# Patient Record
Sex: FEMALE | Race: WHITE | HISPANIC OR LATINO | Employment: STUDENT | URBAN - METROPOLITAN AREA
[De-identification: names, ages, dates, MRNs, and addresses within clinical notes are randomized per-mention and may not be internally consistent; named-entity substitution may affect disease eponyms.]

---
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
## 2021-01-27 ENCOUNTER — OFFICE VISIT (OUTPATIENT)
Dept: NEUROLOGY | Facility: CLINIC | Age: 21
End: 2021-01-27
Payer: COMMERCIAL

## 2021-01-27 DIAGNOSIS — G40.909 SEIZURE DISORDER: ICD-10-CM

## 2021-01-27 PROCEDURE — 99204 PR OFFICE/OUTPT VISIT, NEW, LEVL IV, 45-59 MIN: ICD-10-PCS | Mod: 95,,, | Performed by: PSYCHIATRY & NEUROLOGY

## 2021-01-27 PROCEDURE — 99204 OFFICE O/P NEW MOD 45 MIN: CPT | Mod: 95,,, | Performed by: PSYCHIATRY & NEUROLOGY

## 2021-01-27 RX ORDER — LEVETIRACETAM 500 MG/1
TABLET, EXTENDED RELEASE ORAL
Qty: 150 TABLET | Refills: 11 | Status: SHIPPED | OUTPATIENT
Start: 2021-01-27 | End: 2021-06-03 | Stop reason: SDUPTHER

## 2021-02-06 ENCOUNTER — PATIENT MESSAGE (OUTPATIENT)
Dept: NEUROLOGY | Facility: CLINIC | Age: 21
End: 2021-02-06

## 2021-04-14 ENCOUNTER — TELEPHONE (OUTPATIENT)
Dept: NEUROLOGY | Facility: CLINIC | Age: 21
End: 2021-04-14

## 2021-05-10 ENCOUNTER — PATIENT MESSAGE (OUTPATIENT)
Dept: NEUROLOGY | Facility: CLINIC | Age: 21
End: 2021-05-10

## 2021-05-10 ENCOUNTER — TELEPHONE (OUTPATIENT)
Dept: NEUROLOGY | Facility: CLINIC | Age: 21
End: 2021-05-10

## 2021-05-10 NOTE — TELEPHONE ENCOUNTER
----- Message from Augustus Urena sent at 5/10/2021  4:31 PM CDT -----  Contact: MOM  Pt mom calling still waiting for the corrected paper work for daughter for DMV  and since we had a     delay on this daughter licence is now suspended       Please Call      Contact [phone]

## 2021-05-12 ENCOUNTER — TELEPHONE (OUTPATIENT)
Dept: NEUROLOGY | Facility: CLINIC | Age: 21
End: 2021-05-12

## 2021-06-03 ENCOUNTER — OFFICE VISIT (OUTPATIENT)
Dept: NEUROLOGY | Facility: CLINIC | Age: 21
End: 2021-06-03
Payer: COMMERCIAL

## 2021-06-03 VITALS
BODY MASS INDEX: 19.77 KG/M2 | DIASTOLIC BLOOD PRESSURE: 64 MMHG | HEART RATE: 69 BPM | WEIGHT: 123 LBS | HEIGHT: 66 IN | SYSTOLIC BLOOD PRESSURE: 105 MMHG

## 2021-06-03 DIAGNOSIS — G40.909 SEIZURE DISORDER: ICD-10-CM

## 2021-06-03 PROCEDURE — 99214 OFFICE O/P EST MOD 30 MIN: CPT | Mod: S$GLB,,, | Performed by: PSYCHIATRY & NEUROLOGY

## 2021-06-03 PROCEDURE — 99999 PR PBB SHADOW E&M-EST. PATIENT-LVL II: CPT | Mod: PBBFAC,,, | Performed by: PSYCHIATRY & NEUROLOGY

## 2021-06-03 PROCEDURE — 99999 PR PBB SHADOW E&M-EST. PATIENT-LVL II: ICD-10-PCS | Mod: PBBFAC,,, | Performed by: PSYCHIATRY & NEUROLOGY

## 2021-06-03 PROCEDURE — 99214 PR OFFICE/OUTPT VISIT, EST, LEVL IV, 30-39 MIN: ICD-10-PCS | Mod: S$GLB,,, | Performed by: PSYCHIATRY & NEUROLOGY

## 2021-06-03 RX ORDER — LEVETIRACETAM 500 MG/1
TABLET, EXTENDED RELEASE ORAL
Qty: 150 TABLET | Refills: 11 | Status: SHIPPED | OUTPATIENT
Start: 2021-06-03 | End: 2022-09-12 | Stop reason: SDUPTHER

## 2022-04-26 ENCOUNTER — LAB VISIT (OUTPATIENT)
Dept: LAB | Facility: HOSPITAL | Age: 22
End: 2022-04-26
Attending: PSYCHIATRY & NEUROLOGY
Payer: COMMERCIAL

## 2022-04-26 ENCOUNTER — OFFICE VISIT (OUTPATIENT)
Dept: NEUROLOGY | Facility: CLINIC | Age: 22
End: 2022-04-26
Payer: COMMERCIAL

## 2022-04-26 VITALS
WEIGHT: 127.75 LBS | DIASTOLIC BLOOD PRESSURE: 67 MMHG | HEART RATE: 85 BPM | SYSTOLIC BLOOD PRESSURE: 99 MMHG | BODY MASS INDEX: 20.62 KG/M2 | RESPIRATION RATE: 18 BRPM

## 2022-04-26 DIAGNOSIS — G40.909 SEIZURE DISORDER: ICD-10-CM

## 2022-04-26 DIAGNOSIS — R41.840 ATTENTION DEFICIT: ICD-10-CM

## 2022-04-26 DIAGNOSIS — G40.909 SEIZURE DISORDER: Primary | ICD-10-CM

## 2022-04-26 LAB
ALBUMIN SERPL BCP-MCNC: 4.3 G/DL (ref 3.5–5.2)
ALP SERPL-CCNC: 56 U/L (ref 55–135)
ALT SERPL W/O P-5'-P-CCNC: 11 U/L (ref 10–44)
ANION GAP SERPL CALC-SCNC: 9 MMOL/L (ref 8–16)
AST SERPL-CCNC: 17 U/L (ref 10–40)
BILIRUB SERPL-MCNC: 0.4 MG/DL (ref 0.1–1)
BUN SERPL-MCNC: 8 MG/DL (ref 6–20)
CALCIUM SERPL-MCNC: 9.8 MG/DL (ref 8.7–10.5)
CHLORIDE SERPL-SCNC: 101 MMOL/L (ref 95–110)
CO2 SERPL-SCNC: 28 MMOL/L (ref 23–29)
CREAT SERPL-MCNC: 0.7 MG/DL (ref 0.5–1.4)
EST. GFR  (AFRICAN AMERICAN): >60 ML/MIN/1.73 M^2
EST. GFR  (NON AFRICAN AMERICAN): >60 ML/MIN/1.73 M^2
GLUCOSE SERPL-MCNC: 97 MG/DL (ref 70–110)
POTASSIUM SERPL-SCNC: 4.3 MMOL/L (ref 3.5–5.1)
PROT SERPL-MCNC: 7.4 G/DL (ref 6–8.4)
SODIUM SERPL-SCNC: 138 MMOL/L (ref 136–145)

## 2022-04-26 PROCEDURE — 99999 PR PBB SHADOW E&M-EST. PATIENT-LVL III: CPT | Mod: PBBFAC,,, | Performed by: PSYCHIATRY & NEUROLOGY

## 2022-04-26 PROCEDURE — 99215 PR OFFICE/OUTPT VISIT, EST, LEVL V, 40-54 MIN: ICD-10-PCS | Mod: S$GLB,,, | Performed by: PSYCHIATRY & NEUROLOGY

## 2022-04-26 PROCEDURE — 1159F PR MEDICATION LIST DOCUMENTED IN MEDICAL RECORD: ICD-10-PCS | Mod: CPTII,S$GLB,, | Performed by: PSYCHIATRY & NEUROLOGY

## 2022-04-26 PROCEDURE — 3078F DIAST BP <80 MM HG: CPT | Mod: CPTII,S$GLB,, | Performed by: PSYCHIATRY & NEUROLOGY

## 2022-04-26 PROCEDURE — 85025 COMPLETE CBC W/AUTO DIFF WBC: CPT | Performed by: PSYCHIATRY & NEUROLOGY

## 2022-04-26 PROCEDURE — 3074F PR MOST RECENT SYSTOLIC BLOOD PRESSURE < 130 MM HG: ICD-10-PCS | Mod: CPTII,S$GLB,, | Performed by: PSYCHIATRY & NEUROLOGY

## 2022-04-26 PROCEDURE — 99999 PR PBB SHADOW E&M-EST. PATIENT-LVL III: ICD-10-PCS | Mod: PBBFAC,,, | Performed by: PSYCHIATRY & NEUROLOGY

## 2022-04-26 PROCEDURE — 3008F PR BODY MASS INDEX (BMI) DOCUMENTED: ICD-10-PCS | Mod: CPTII,S$GLB,, | Performed by: PSYCHIATRY & NEUROLOGY

## 2022-04-26 PROCEDURE — 1159F MED LIST DOCD IN RCRD: CPT | Mod: CPTII,S$GLB,, | Performed by: PSYCHIATRY & NEUROLOGY

## 2022-04-26 PROCEDURE — 3078F PR MOST RECENT DIASTOLIC BLOOD PRESSURE < 80 MM HG: ICD-10-PCS | Mod: CPTII,S$GLB,, | Performed by: PSYCHIATRY & NEUROLOGY

## 2022-04-26 PROCEDURE — 3074F SYST BP LT 130 MM HG: CPT | Mod: CPTII,S$GLB,, | Performed by: PSYCHIATRY & NEUROLOGY

## 2022-04-26 PROCEDURE — 3008F BODY MASS INDEX DOCD: CPT | Mod: CPTII,S$GLB,, | Performed by: PSYCHIATRY & NEUROLOGY

## 2022-04-26 PROCEDURE — 80053 COMPREHEN METABOLIC PANEL: CPT | Performed by: PSYCHIATRY & NEUROLOGY

## 2022-04-26 PROCEDURE — 36415 COLL VENOUS BLD VENIPUNCTURE: CPT | Mod: PO | Performed by: PSYCHIATRY & NEUROLOGY

## 2022-04-26 PROCEDURE — 99215 OFFICE O/P EST HI 40 MIN: CPT | Mod: S$GLB,,, | Performed by: PSYCHIATRY & NEUROLOGY

## 2022-04-26 NOTE — PROGRESS NOTES
"Date of service:  04/26/2022     Chief complaint:  Seizures    History of present illness:  The patient is a 21 y.o. female we have been asked to see for evaluation of episodes suspicious for seizures.  This is my first time seeing her.  She previously saw Dr. Sams and Dr. Still.    Absence seizures  These began around age 15. With respect to aura, the patient reports nothing.  Her seizure is characterized by behavioral arrest.  This component of this spell lasts for approximately a few seconds.  Afterwards, she reports maybe feeling a bit "spacey."  The patient's frequency of events is low.  She does not think she has had one in years.    GTC seizures  These began around age 15. With respect to aura, the patient reports nothing.  Her seizure is characterized by loss of consciousness and generalized convulsion.  This component of this spell lasts for less than 2 minutes.  Afterwards, she reports it takes a while for her to fully regain consciousness and that she may be "spacey" the following days.  The patient has had approximately 5 of these events, the most recent was about 4 years ago.    Potential Epilepsy Risk Factors:   Pregnancy/Labor/Delivery - none  Febrile seizures - none  Head injury  - none  CNS infection - none     Stroke - none  Family Hx of Sz - none    AED Treatments  Present regimen  Keppra XR 1000 mg QD      Past Medical History:   Diagnosis Date    Epilepsy        History reviewed. No pertinent surgical history.    History reviewed. No pertinent family history.    Social History     Socioeconomic History    Marital status: Single   Tobacco Use    Smoking status: Never Smoker    Smokeless tobacco: Never Used   Substance and Sexual Activity    Alcohol use: No    Drug use: No    Sexual activity: Never        Review of patient's allergies indicates:   Allergen Reactions    Penicillins Rash        Review of Systems  The patient's ROS is noncontributory except as noted above.     Physical " "exam:  BP 99/67 (BP Location: Left arm, Patient Position: Sitting, BP Method: Medium (Automatic))   Pulse 85   Resp 18   Wt 58 kg (127 lb 12.1 oz)   BMI 20.62 kg/m²   General: Well developed, well nourished.  No acute distress.  ENT: Mucus membranes moist.  Atraumatic external nose and ears.  Lymphatic: No apparent lymphadenopathy.  Cardiovascular: Regular rate and rhythm.  Pulmonary: No increased work of breathing.  Abdomen/GI: No guarding.  Musculoskeletal: No obvious joint deformities, moves all extremities well.    Neurological exam:  Mental status: Awake and alert.  Oriented x4.  Speech fluent and appropriate.  Recent and remote memory appear to be intact.  Fund of knowledge normal.  Cranial nerves: Pupils equal round and reactive to light, extraocular movements intact, facial strength and sensation intact bilaterally, palate and tongue midline, hearing grossly intact bilaterally.  Motor: 5 out of 5 strength throughout the upper and lower extremities bilaterally. Normal bulk and tone.  Sensation: Intact to light touch and temperature bilaterally.  DTR: 2+ at the knees and biceps bilaterally.  Coordination: Finger-nose-finger testing intact bilaterally.  Gait: Normal gait. Good tandem.    Data base:  Notes from her prior neurologists were reviewed.  Briefly summarized, these discuss her seizure disorder.    MRI brain:  "Exam limited by metal artifact from dental braces.  Otherwise unremarkable study. No significant intracranial abnormality is identified."  I independently visualized the images of this study and interpreted them.  No acute intracranial process was appreciated.  Exam was technically limited by metallic artifact.    EEG (11/16):  "Probably abnormal EEG due to notched 3-4 cycle per second, paroxysmal slowing during hyperventilation, which is questionably epileptic in type.  Clinical correlation is suggested."  Her other EEGs here have been normal.     Assessment and plan:  The patient is a 21 " y.o. female we have been asked to see for evaluation for events worrisome for seizures. The differential includes PGE, including SOMMER.  As far as medications go, the patient and her mother have questions as to whether or not the Keppra may be exacerbating her anxiety.  After she returns from [facility], we will try switching to Briviact. Medication side effects were discussed with the patient.  Teratogenicity of AEDs were discussed.  She was instructed to make sure she was utilizing a reliable means of birth control.  Should she decide to become pregnant, she is to contact us first, so we can determine what medication adjustments, if any, are appropriate.  The patient was counseled that the risks posed by uncontrolled seizures typically exceeds that posed by the medications themselves. Consequently, the patient was advised of the importance of continuing her anticonvulsant medication should she become pregnant.  She was advised to take supplemental folate.  State law as it pertains to driving for individuals with seizures was discussed.  The patient was also counseled on seizure safety.     The patient also had questions about possible ADHD.  We will refer her to psychiatry for further evaluation.    We will plan on seeing the patient back in a few months.

## 2022-04-27 LAB
BASOPHILS # BLD AUTO: 0.04 K/UL (ref 0–0.2)
BASOPHILS NFR BLD: 0.5 % (ref 0–1.9)
DIFFERENTIAL METHOD: ABNORMAL
EOSINOPHIL # BLD AUTO: 0.2 K/UL (ref 0–0.5)
EOSINOPHIL NFR BLD: 2.2 % (ref 0–8)
ERYTHROCYTE [DISTWIDTH] IN BLOOD BY AUTOMATED COUNT: 11.7 % (ref 11.5–14.5)
HCT VFR BLD AUTO: 38.2 % (ref 37–48.5)
HGB BLD-MCNC: 12.1 G/DL (ref 12–16)
IMM GRANULOCYTES # BLD AUTO: 0.02 K/UL (ref 0–0.04)
IMM GRANULOCYTES NFR BLD AUTO: 0.3 % (ref 0–0.5)
LYMPHOCYTES # BLD AUTO: 2.2 K/UL (ref 1–4.8)
LYMPHOCYTES NFR BLD: 27.6 % (ref 18–48)
MCH RBC QN AUTO: 28.4 PG (ref 27–31)
MCHC RBC AUTO-ENTMCNC: 31.7 G/DL (ref 32–36)
MCV RBC AUTO: 90 FL (ref 82–98)
MONOCYTES # BLD AUTO: 0.5 K/UL (ref 0.3–1)
MONOCYTES NFR BLD: 6.4 % (ref 4–15)
NEUTROPHILS # BLD AUTO: 4.9 K/UL (ref 1.8–7.7)
NEUTROPHILS NFR BLD: 63 % (ref 38–73)
NRBC BLD-RTO: 0 /100 WBC
PLATELET # BLD AUTO: 270 K/UL (ref 150–450)
PMV BLD AUTO: 11.5 FL (ref 9.2–12.9)
RBC # BLD AUTO: 4.26 M/UL (ref 4–5.4)
WBC # BLD AUTO: 7.78 K/UL (ref 3.9–12.7)

## 2022-05-03 ENCOUNTER — PATIENT MESSAGE (OUTPATIENT)
Dept: PSYCHIATRY | Facility: CLINIC | Age: 22
End: 2022-05-03
Payer: COMMERCIAL

## 2022-05-10 ENCOUNTER — PATIENT MESSAGE (OUTPATIENT)
Dept: PSYCHIATRY | Facility: CLINIC | Age: 22
End: 2022-05-10
Payer: COMMERCIAL

## 2022-05-20 ENCOUNTER — TELEPHONE (OUTPATIENT)
Dept: PSYCHIATRY | Facility: CLINIC | Age: 22
End: 2022-05-20
Payer: COMMERCIAL

## 2022-05-20 NOTE — TELEPHONE ENCOUNTER
Called preferred number on file. It was the patients mom, said patient is out town and mom asked if we can call back on Monday.

## 2022-05-25 ENCOUNTER — TELEPHONE (OUTPATIENT)
Dept: PSYCHIATRY | Facility: CLINIC | Age: 22
End: 2022-05-25
Payer: COMMERCIAL

## 2022-05-25 NOTE — TELEPHONE ENCOUNTER
Called preferred number on file again. Spoke with the mom again. She forgot to give delfino the message. She asked for our phone number and our hours and who to ask for.   I gave her the number told her we are here Monday - Friday 8-5 and that she can ask for FROYLAN Portillo.    If patient does not call back in one week, I will cancel the referral. Since this was the 4th attempt of getting in touch with the patient.

## 2022-06-12 ENCOUNTER — PATIENT MESSAGE (OUTPATIENT)
Dept: NEUROLOGY | Facility: CLINIC | Age: 22
End: 2022-06-12
Payer: COMMERCIAL

## 2022-09-11 ENCOUNTER — PATIENT MESSAGE (OUTPATIENT)
Dept: NEUROLOGY | Facility: CLINIC | Age: 22
End: 2022-09-11
Payer: COMMERCIAL

## 2022-09-11 DIAGNOSIS — G40.909 SEIZURE DISORDER: ICD-10-CM

## 2022-09-12 RX ORDER — LEVETIRACETAM 500 MG/1
TABLET, EXTENDED RELEASE ORAL
Qty: 150 TABLET | Refills: 11 | Status: SHIPPED | OUTPATIENT
Start: 2022-09-12 | End: 2023-09-14 | Stop reason: SDUPTHER

## 2022-09-13 ENCOUNTER — PATIENT MESSAGE (OUTPATIENT)
Dept: NEUROLOGY | Facility: CLINIC | Age: 22
End: 2022-09-13
Payer: COMMERCIAL

## 2022-09-14 ENCOUNTER — PATIENT MESSAGE (OUTPATIENT)
Dept: NEUROLOGY | Facility: CLINIC | Age: 22
End: 2022-09-14
Payer: COMMERCIAL

## 2022-09-14 NOTE — TELEPHONE ENCOUNTER
Please see pt portal message regarding changing AED. Pt last seen 4/26/22--mentions switching from Keppra to Briviact. F/u not currently scheduled--messaged pt to help schedule. Please advise.

## 2022-09-14 NOTE — TELEPHONE ENCOUNTER
"Per Dr. Duenas: "1. She should make the appointment with psychiatry.  She should be aware that I do not diagnose or treat ADHD.   2. She and her mother did not want to start Briviact prior to her trip, so I did not write it at the time of her visit.   She does need to follow up in clinic with me as well." Waiting for portal response from pt to assist in scheduling appt with Dr. Duenas. Also, advised pt of needing appt with psychiatry and provided her with contact info to office to schedule appt.   "

## 2023-04-04 ENCOUNTER — PATIENT MESSAGE (OUTPATIENT)
Dept: NEUROLOGY | Facility: CLINIC | Age: 23
End: 2023-04-04
Payer: COMMERCIAL

## 2023-06-04 ENCOUNTER — PATIENT MESSAGE (OUTPATIENT)
Dept: NEUROLOGY | Facility: CLINIC | Age: 23
End: 2023-06-04
Payer: COMMERCIAL

## 2023-06-05 NOTE — TELEPHONE ENCOUNTER
Called pt to schedule an appt with AG for sz. Pt states cant talk now. Number given for pt to call back and schedule.

## 2023-06-29 ENCOUNTER — TELEPHONE (OUTPATIENT)
Dept: NEUROLOGY | Facility: CLINIC | Age: 23
End: 2023-06-29
Payer: COMMERCIAL

## 2023-06-29 NOTE — TELEPHONE ENCOUNTER
----- Message from Marvin Junior sent at 6/29/2023  4:16 PM CDT -----  Regarding: [facility] former PT Dr Duenas // [facility]  Type:  Sooner Appointment Request    Caller is requesting a sooner appointment.      Name of Caller:  Mimi    When is the first available appointment?  Dept book    Symptoms:  SZ    Best Call Back Number:  [phone]    Additional Information:

## 2023-06-29 NOTE — TELEPHONE ENCOUNTER
Spoke to the pt, appt scheduled on 9/14/23 at 0800 with Dr. Dangelo for seizures. Dr. Duenas pt.   [General Appearance - Well Developed] : well developed [General Appearance - Well Nourished] : well nourished [Normal Appearance] : normal appearance [Well Groomed] : well groomed [General Appearance - In No Acute Distress] : no acute distress [Abdomen Soft] : soft [Abdomen Tenderness] : non-tender [Costovertebral Angle Tenderness] : no ~M costovertebral angle tenderness [Oriented To Time, Place, And Person] : oriented to person, place, and time [Affect] : the affect was normal [Mood] : the mood was normal [Not Anxious] : not anxious [Normal Station and Gait] : the gait and station were normal for the patient's age [No Focal Deficits] : no focal deficits

## 2023-09-12 ENCOUNTER — TELEPHONE (OUTPATIENT)
Dept: NEUROLOGY | Facility: CLINIC | Age: 23
End: 2023-09-12
Payer: COMMERCIAL

## 2023-09-12 ENCOUNTER — PATIENT MESSAGE (OUTPATIENT)
Dept: NEUROLOGY | Facility: CLINIC | Age: 23
End: 2023-09-12
Payer: COMMERCIAL

## 2023-09-14 ENCOUNTER — OFFICE VISIT (OUTPATIENT)
Dept: NEUROLOGY | Facility: CLINIC | Age: 23
End: 2023-09-14
Payer: COMMERCIAL

## 2023-09-14 VITALS
SYSTOLIC BLOOD PRESSURE: 111 MMHG | DIASTOLIC BLOOD PRESSURE: 64 MMHG | HEART RATE: 102 BPM | BODY MASS INDEX: 22.08 KG/M2 | HEIGHT: 66 IN | WEIGHT: 137.38 LBS

## 2023-09-14 DIAGNOSIS — G40.909 SEIZURE DISORDER: ICD-10-CM

## 2023-09-14 PROCEDURE — 99999 PR PBB SHADOW E&M-EST. PATIENT-LVL III: CPT | Mod: PBBFAC,,, | Performed by: PSYCHIATRY & NEUROLOGY

## 2023-09-14 PROCEDURE — 1160F RVW MEDS BY RX/DR IN RCRD: CPT | Mod: CPTII,S$GLB,, | Performed by: PSYCHIATRY & NEUROLOGY

## 2023-09-14 PROCEDURE — 3008F PR BODY MASS INDEX (BMI) DOCUMENTED: ICD-10-PCS | Mod: CPTII,S$GLB,, | Performed by: PSYCHIATRY & NEUROLOGY

## 2023-09-14 PROCEDURE — 3078F PR MOST RECENT DIASTOLIC BLOOD PRESSURE < 80 MM HG: ICD-10-PCS | Mod: CPTII,S$GLB,, | Performed by: PSYCHIATRY & NEUROLOGY

## 2023-09-14 PROCEDURE — 3078F DIAST BP <80 MM HG: CPT | Mod: CPTII,S$GLB,, | Performed by: PSYCHIATRY & NEUROLOGY

## 2023-09-14 PROCEDURE — 99999 PR PBB SHADOW E&M-EST. PATIENT-LVL III: ICD-10-PCS | Mod: PBBFAC,,, | Performed by: PSYCHIATRY & NEUROLOGY

## 2023-09-14 PROCEDURE — 99215 OFFICE O/P EST HI 40 MIN: CPT | Mod: S$GLB,,, | Performed by: PSYCHIATRY & NEUROLOGY

## 2023-09-14 PROCEDURE — 3074F PR MOST RECENT SYSTOLIC BLOOD PRESSURE < 130 MM HG: ICD-10-PCS | Mod: CPTII,S$GLB,, | Performed by: PSYCHIATRY & NEUROLOGY

## 2023-09-14 PROCEDURE — 1159F MED LIST DOCD IN RCRD: CPT | Mod: CPTII,S$GLB,, | Performed by: PSYCHIATRY & NEUROLOGY

## 2023-09-14 PROCEDURE — 1160F PR REVIEW ALL MEDS BY PRESCRIBER/CLIN PHARMACIST DOCUMENTED: ICD-10-PCS | Mod: CPTII,S$GLB,, | Performed by: PSYCHIATRY & NEUROLOGY

## 2023-09-14 PROCEDURE — 3074F SYST BP LT 130 MM HG: CPT | Mod: CPTII,S$GLB,, | Performed by: PSYCHIATRY & NEUROLOGY

## 2023-09-14 PROCEDURE — 1159F PR MEDICATION LIST DOCUMENTED IN MEDICAL RECORD: ICD-10-PCS | Mod: CPTII,S$GLB,, | Performed by: PSYCHIATRY & NEUROLOGY

## 2023-09-14 PROCEDURE — 99215 PR OFFICE/OUTPT VISIT, EST, LEVL V, 40-54 MIN: ICD-10-PCS | Mod: S$GLB,,, | Performed by: PSYCHIATRY & NEUROLOGY

## 2023-09-14 PROCEDURE — 3008F BODY MASS INDEX DOCD: CPT | Mod: CPTII,S$GLB,, | Performed by: PSYCHIATRY & NEUROLOGY

## 2023-09-14 RX ORDER — LEVETIRACETAM 500 MG/1
TABLET, EXTENDED RELEASE ORAL
Qty: 450 TABLET | Refills: 3 | Status: SHIPPED | OUTPATIENT
Start: 2023-09-14 | End: 2024-01-18 | Stop reason: SDUPTHER

## 2023-09-14 NOTE — PATIENT INSTRUCTIONS
During a seizure:  - Ensure the person is in a safe place, remove hard or sharp objects from the area  - Turn the person on their side  - Never force anything into their mouth  - Keep on eye on the time, if the jerking/shaking/tensing of the muscles lasts > 5 minutes, call 911.    After a seizure:  - Allow them to lie quietly, gently call them to see if they wake up  - If there is injury or if they are not waking up within 5 minutes, call 911    Safety:  - Take showers, not baths  - Take care when around bodies of water (swimming pools, lakes, etc) and wear protective equipment. Do not swim alone  - Use equipment with automatic shutoff safety features  - Do not climb ladders or use heavy machinery until seizure-free for 6 months  - Use the back burners when cooking  - If you have children, change diapers on the floor and avoid holding them while taking stairs  - Do not drive until seizure-free for 6 months    For women:  - Take folic acid supplement daily (4 mg daily recommended)  - Know that birth control can affect your medication and your medication may make birth control less effective  - If you do become pregnant or are thinking of becoming pregnant, be sure to talk to me  - It is important to continue taking your seizure medication while pregnant and we need to monitor you much more closely during pregnancy    Triggers:  - Be sure to take you medication as scheduled without missed doses  - Be sure to get 8 hours of sleep each night  - Certain medications to avoid:   - Benadryl (diphenhydramine)   - Tramadol (Ultram)   - Certain antibiotics in the fluoroquinolone class (levaquin or cipro)   - Wellbutrin    - Chantix   - Alcohol   - Other illegal drugs or stimulant medications  - Herbal supplements to avoid that can lower the seizure threshold:   - Asafoetida, Borage, Ephedra, Ergot, Eucalyptus, Evening primrose, Ginkgo biloba, Ginseng, Pennyroyal, Elier, Shankpushpi, Star anise, Star fruit, [place], and  Yohimbine     Epilepsy Cold Treatments    Fine to Take:  Flonase  Saline nasal spray  Tylenol/ibuprofen  Guaifenesin     May lower seizure threshold but typically are OK to take if you really need them:  Phenylephrine  Claritin, Zyrtec, other antihistamines  Dextromethorphan    DO NOT TAKE:  Benadryl (diphenhydramine)  Pseudoephedrine  If you need antibiotic, do not take levaquin or ciprofloxacin

## 2023-09-14 NOTE — PROGRESS NOTES
"    Date: 9/14/2023    Patient ID: Mimi Meneses is a 23 y.o. female.    Referring Provider:  No ref. provider found    Chief Complaint: Seizures      History of Present Illness:  Ms. Meneses is a 23 y.o. female who presents today to establish care for epilepsy. This is my first time seeing her.  She previously saw Dr. Sams, Dr. Still, and Dr. Duenas.    Interval history: No seizures since 2018. No myoclonus. She continues on Keppra XR 1000 mg in the morning and 1500 mg at night. No side effects.      Seizure history:  Absence seizures  These began around age 15. With respect to aura, the patient reports nothing.  Her seizure is characterized by behavioral arrest.  This component of this spell lasts for approximately a few seconds.  Afterwards, she reports maybe feeling a bit "spacey."  The patient's frequency of events is low.  She does not think she has had one in years.     GTC seizures  These began around age 15. With respect to aura, the patient reports nothing.  Her seizure is characterized by loss of consciousness and generalized convulsion.  This component of this spell lasts for less than 2 minutes.  Afterwards, she reports it takes a while for her to fully regain consciousness and that she may be "spacey" the following days.  The patient has had approximately 5 of these events, the most recent was in 2018.      Potential Epilepsy Risk Factors:   Pregnancy/Labor/Delivery - none  Febrile seizures - none  Head injury  - none  CNS infection - none     Stroke - none  Family Hx of Sz - none     AED Treatments  Present regimen  Keppra XR 1000 mg in AM and 1500 mg in PM    MRI brain:  "Exam limited by metal artifact from dental braces.  Otherwise unremarkable study. No significant intracranial abnormality is identified."  I independently visualized the images of this study and interpreted them.  No acute intracranial process was appreciated.  Exam was technically limited by metallic artifact.     EEG " "(11/16):  "Probably abnormal EEG due to notched 3-4 cycle per second, paroxysmal slowing during hyperventilation, which is questionably epileptic in type.  Clinical correlation is suggested."  Her other EEGs here have been normal.        Allergies:  Review of patient's allergies indicates:   Allergen Reactions    Penicillins Rash       Current Medications:  Current Outpatient Medications   Medication Sig Dispense Refill    azelastine (ASTELIN) 137 mcg (0.1 %) nasal spray 1 spray (137 mcg total) by Nasal route 2 (two) times daily. (Patient not taking: No sig reported) 30 mL 0    levetiracetam XR (KEPPRA XR) 500 mg Tb24 24 hr tablet TAKE 2 TABLETS BY MOUTH EVERY MORNING AND 3 AT BEDTIME 450 tablet 3     No current facility-administered medications for this visit.       Past Medical History:  Past Medical History:   Diagnosis Date    Epilepsy        Past Surgical History:  History reviewed. No pertinent surgical history.    Family History:  family history is not on file.    Social History:   reports that she has never smoked. She has never used smokeless tobacco. She reports that she does not drink alcohol and does not use drugs.    Physical Exam:  Vitals:    09/14/23 0806   BP: 111/64   Pulse: 102   Weight: 62.3 kg (137 lb 5.6 oz)   Height: 5' 6" (1.676 m)   PainSc: 0-No pain     Body mass index is 22.17 kg/m².    Neurological Exam:  General: well-developed, well-nourished, no distress  Mental status: Awake and alert  Speech language: No dysarthria or aphasia on conversation  Cranial nerves: Face symmetric  Motor: Moves all extremities well  Coordination: No ataxia. No tremor.      Data:  I have personally reviewed the referring provider's notes, labs, & imaging made available to me today.     Labs:  CBC:   Lab Results   Component Value Date    WBC 7.78 04/26/2022    HGB 12.1 04/26/2022    HCT 38.2 04/26/2022     04/26/2022    MCV 90 04/26/2022    RDW 11.7 04/26/2022     BMP:   Lab Results   Component Value " "Date     04/26/2022    K 4.3 04/26/2022     04/26/2022    CO2 28 04/26/2022    BUN 8 04/26/2022    CREATININE 0.7 04/26/2022    GLU 97 04/26/2022    CALCIUM 9.8 04/26/2022     LFTS;   Lab Results   Component Value Date    PROT 7.4 04/26/2022    ALBUMIN 4.3 04/26/2022    BILITOT 0.4 04/26/2022    AST 17 04/26/2022    ALKPHOS 56 04/26/2022    ALT 11 04/26/2022     COAGS: No results found for: "INR", "PROTIME", "PTT"  FLP: No results found for: "CHOL", "HDL", "LDLCALC", "TRIG", "CHOLHDL"      Assessment and Plan:  Ms. Meneses is a 23 y.o. female here for primary generalized epilepsy. She is well controlled on current dose of keppra XR 1000mg/1500mg. She does not note side effects. We will continue current regimen. Will get updated labs for monitoring purposes.     Patient understands driving laws--ok to drive at this time. We reviewed the importance of limiting medications that may lower the seizure threshold. We reviewed the risks of pregnancy with medications and epilepsy. All questions were answered.      I will plan to see her back yearly.     Seizure disorder  -     levetiracetam XR (KEPPRA XR) 500 mg Tb24 24 hr tablet; TAKE 2 TABLETS BY MOUTH EVERY MORNING AND 3 AT BEDTIME  Dispense: 450 tablet; Refill: 3  -     CBC Auto Differential; Future; Expected date: 09/14/2023  -     Comprehensive metabolic panel; Future; Expected date: 09/14/2023  -     Levetiracetam Level; Future; Expected date: 09/15/2023       I spent a total of 40 minutes on the day of the visit.This includes face to face time and non-face to face time preparing to see the patient (eg, review of tests), Obtaining and/or reviewing separately obtained history, Documenting clinical information in the electronic or other health record, Independently interpreting results and communicating results to the patient/family/caregiver, or Care coordination.   "

## 2023-09-20 ENCOUNTER — PATIENT MESSAGE (OUTPATIENT)
Dept: NEUROLOGY | Facility: CLINIC | Age: 23
End: 2023-09-20
Payer: COMMERCIAL

## 2023-09-26 ENCOUNTER — PATIENT MESSAGE (OUTPATIENT)
Dept: NEUROLOGY | Facility: CLINIC | Age: 23
End: 2023-09-26
Payer: COMMERCIAL

## 2023-09-27 ENCOUNTER — LAB VISIT (OUTPATIENT)
Dept: LAB | Facility: HOSPITAL | Age: 23
End: 2023-09-27
Attending: PSYCHIATRY & NEUROLOGY
Payer: COMMERCIAL

## 2023-09-27 DIAGNOSIS — G40.909 SEIZURE DISORDER: ICD-10-CM

## 2023-09-27 LAB
ALBUMIN SERPL BCP-MCNC: 4.2 G/DL (ref 3.5–5.2)
ALP SERPL-CCNC: 51 U/L (ref 55–135)
ALT SERPL W/O P-5'-P-CCNC: 14 U/L (ref 10–44)
ANION GAP SERPL CALC-SCNC: 6 MMOL/L (ref 8–16)
AST SERPL-CCNC: 19 U/L (ref 10–40)
BASOPHILS # BLD AUTO: 0.03 K/UL (ref 0–0.2)
BASOPHILS NFR BLD: 0.4 % (ref 0–1.9)
BILIRUB SERPL-MCNC: 0.6 MG/DL (ref 0.1–1)
BUN SERPL-MCNC: 9 MG/DL (ref 6–20)
CALCIUM SERPL-MCNC: 9.4 MG/DL (ref 8.7–10.5)
CHLORIDE SERPL-SCNC: 105 MMOL/L (ref 95–110)
CO2 SERPL-SCNC: 28 MMOL/L (ref 23–29)
CREAT SERPL-MCNC: 0.8 MG/DL (ref 0.5–1.4)
DIFFERENTIAL METHOD: ABNORMAL
EOSINOPHIL # BLD AUTO: 0.1 K/UL (ref 0–0.5)
EOSINOPHIL NFR BLD: 1.8 % (ref 0–8)
ERYTHROCYTE [DISTWIDTH] IN BLOOD BY AUTOMATED COUNT: 11.7 % (ref 11.5–14.5)
EST. GFR  (NO RACE VARIABLE): >60 ML/MIN/1.73 M^2
GLUCOSE SERPL-MCNC: 85 MG/DL (ref 70–110)
HCT VFR BLD AUTO: 36.2 % (ref 37–48.5)
HGB BLD-MCNC: 12.3 G/DL (ref 12–16)
IMM GRANULOCYTES # BLD AUTO: 0.02 K/UL (ref 0–0.04)
IMM GRANULOCYTES NFR BLD AUTO: 0.3 % (ref 0–0.5)
LYMPHOCYTES # BLD AUTO: 2.4 K/UL (ref 1–4.8)
LYMPHOCYTES NFR BLD: 31.4 % (ref 18–48)
MCH RBC QN AUTO: 29.1 PG (ref 27–31)
MCHC RBC AUTO-ENTMCNC: 34 G/DL (ref 32–36)
MCV RBC AUTO: 86 FL (ref 82–98)
MONOCYTES # BLD AUTO: 0.5 K/UL (ref 0.3–1)
MONOCYTES NFR BLD: 6.3 % (ref 4–15)
NEUTROPHILS # BLD AUTO: 4.6 K/UL (ref 1.8–7.7)
NEUTROPHILS NFR BLD: 59.8 % (ref 38–73)
NRBC BLD-RTO: 0 /100 WBC
PLATELET # BLD AUTO: 237 K/UL (ref 150–450)
PMV BLD AUTO: 11.1 FL (ref 9.2–12.9)
POTASSIUM SERPL-SCNC: 4.1 MMOL/L (ref 3.5–5.1)
PROT SERPL-MCNC: 7.2 G/DL (ref 6–8.4)
RBC # BLD AUTO: 4.23 M/UL (ref 4–5.4)
SODIUM SERPL-SCNC: 139 MMOL/L (ref 136–145)
WBC # BLD AUTO: 7.67 K/UL (ref 3.9–12.7)

## 2023-09-27 PROCEDURE — 36415 COLL VENOUS BLD VENIPUNCTURE: CPT | Mod: PO | Performed by: PSYCHIATRY & NEUROLOGY

## 2023-09-27 PROCEDURE — 85025 COMPLETE CBC W/AUTO DIFF WBC: CPT | Performed by: PSYCHIATRY & NEUROLOGY

## 2023-09-27 PROCEDURE — 80177 DRUG SCRN QUAN LEVETIRACETAM: CPT | Performed by: PSYCHIATRY & NEUROLOGY

## 2023-09-27 PROCEDURE — 80053 COMPREHEN METABOLIC PANEL: CPT | Performed by: PSYCHIATRY & NEUROLOGY

## 2023-09-30 LAB — LEVETIRACETAM SERPL-MCNC: 31.5 UG/ML (ref 3–60)

## 2024-01-18 DIAGNOSIS — G40.909 SEIZURE DISORDER: ICD-10-CM

## 2024-01-18 RX ORDER — LEVETIRACETAM 500 MG/1
TABLET, EXTENDED RELEASE ORAL
Qty: 450 TABLET | Refills: 3 | Status: SHIPPED | OUTPATIENT
Start: 2024-01-18 | End: 2024-03-19 | Stop reason: SDUPTHER

## 2024-03-19 DIAGNOSIS — G40.909 SEIZURE DISORDER: ICD-10-CM

## 2024-03-19 RX ORDER — LEVETIRACETAM 500 MG/1
TABLET, EXTENDED RELEASE ORAL
Qty: 450 TABLET | Refills: 1 | Status: SHIPPED | OUTPATIENT
Start: 2024-03-19

## 2024-03-19 RX ORDER — LEVETIRACETAM 500 MG/1
TABLET, EXTENDED RELEASE ORAL
Qty: 450 TABLET | Refills: 1 | Status: SHIPPED | OUTPATIENT
Start: 2024-03-19 | End: 2024-03-19 | Stop reason: SDUPTHER

## 2024-03-19 NOTE — TELEPHONE ENCOUNTER
----- Message from Therese Galvan sent at 3/19/2024 10:35 AM CDT -----  Contact: Mom - Marlin  Pts Mother is calling in regards to pts levetiracetam XR (KEPPRA XR) 500 mg Tb24 24 hr tablet, she is wanting to see if it would be cheaper to have the prescription filled through [facility], since [facility] keeps raising the prices on this medication. She is wanting to know if she should  this months supply from her [facility] and see about switching it afterwards to [facility]. She is wanting to see if there was a way we can get this set up? Can we please call Mom back to advise at [phone] . Thank You.

## 2024-06-14 ENCOUNTER — PATIENT MESSAGE (OUTPATIENT)
Dept: NEUROLOGY | Facility: CLINIC | Age: 24
End: 2024-06-14
Payer: COMMERCIAL

## 2024-08-21 ENCOUNTER — TELEPHONE (OUTPATIENT)
Dept: NEUROLOGY | Facility: CLINIC | Age: 24
End: 2024-08-21
Payer: COMMERCIAL

## 2024-08-21 NOTE — TELEPHONE ENCOUNTER
MAURICEM informing patient her message about being evaluated for ADHD was sent to Neurology Dept in error.  Her message for routed to Dr. Caroline Mir.

## 2024-09-21 ENCOUNTER — ON-DEMAND VIRTUAL (OUTPATIENT)
Dept: URGENT CARE | Facility: CLINIC | Age: 24
End: 2024-09-21
Payer: COMMERCIAL

## 2024-09-21 ENCOUNTER — NURSE TRIAGE (OUTPATIENT)
Dept: ADMINISTRATIVE | Facility: CLINIC | Age: 24
End: 2024-09-21
Payer: COMMERCIAL

## 2024-09-21 DIAGNOSIS — G40.909 SEIZURE DISORDER: ICD-10-CM

## 2024-09-21 DIAGNOSIS — G40.909 NONINTRACTABLE EPILEPSY WITHOUT STATUS EPILEPTICUS, UNSPECIFIED EPILEPSY TYPE: Primary | ICD-10-CM

## 2024-09-21 PROCEDURE — 99213 OFFICE O/P EST LOW 20 MIN: CPT | Mod: CC,95,, | Performed by: FAMILY MEDICINE

## 2024-09-21 RX ORDER — LEVETIRACETAM 500 MG/1
TABLET, EXTENDED RELEASE ORAL
Qty: 70 TABLET | Refills: 1 | Status: SHIPPED | OUTPATIENT
Start: 2024-09-21 | End: 2024-09-23

## 2024-09-21 NOTE — PROGRESS NOTES
Subjective:      Patient ID: Mimi Meneses is a 24 y.o. female.    Vitals:  vitals were not taken for this visit.     Chief Complaint: Medication Refill      Visit Type: TELE AUDIOVISUAL    Present with the patient at the time of consultation: TELEMED PRESENT WITH PATIENT: None    Past Medical History:   Diagnosis Date    Epilepsy      History reviewed. No pertinent surgical history.  Review of patient's allergies indicates:   Allergen Reactions    Penicillins Rash     Current Outpatient Medications on File Prior to Visit   Medication Sig Dispense Refill    azelastine (ASTELIN) 137 mcg (0.1 %) nasal spray 1 spray (137 mcg total) by Nasal route 2 (two) times daily. (Patient not taking: No sig reported) 30 mL 0    [DISCONTINUED] levetiracetam XR (KEPPRA XR) 500 mg Tb24 24 hr tablet TAKE 2 TABLETS BY MOUTH EVERY MORNING AND 3 AT BEDTIME 450 tablet 1     No current facility-administered medications on file prior to visit.     No family history on file.    Medications Ordered                [id number] - [place] [address] [facility] [place] [address] [address] [facility] [facility] [address]    Telephone: [phone]   Fax: [phone]   Hours: Open 24 hours                         E-Prescribed (1 of 1)              levetiracetam XR (KEPPRA XR) 500 mg Tb24 24 hr tablet    Sig: TAKE 2 TABLETS BY MOUTH EVERY MORNING AND 3 AT BEDTIME       Start: 9/21/24     Quantity: 70 tablet Refills: 1                           Oh[facility] Peq Odvv Intake    9/21/2024  5:15 PM CDT - Filed by Patient   What is your current physical address in the event of a medical emergency? [address] [place] [address]   Are you able to take your vital signs? No   Please attach any relevant images or files          Patient has a history of a seizure since she was 15 years old, she lost her insurance changing to Medicaid, medical record review the last  year sept   With her neurologist  she states  that 3 received a 90 days Keppra supply, last refills, she only has 1 day medication t left hat she just got her Medicaid card on Friday, looking into new provider next week on Monday,Medical record review from last year September 2023 from her neurologist      ROS     Objective:   The physical exam was conducted virtually.  Physical Exam   Constitutional: She is oriented to person, place, and time.   HENT:   Head: Normocephalic and atraumatic.   Eyes: Conjunctivae are normal.   Pulmonary/Chest: Effort normal. No respiratory distress.   Abdominal: Normal appearance.   Neurological: no focal deficit. She is alert and oriented to person, place, and time.   Skin: Skin is no rash.   Psychiatric: Mood, judgment and thought content normal.       Assessment:     1. Nonintractable epilepsy without status epilepticus, unspecified epilepsy type    2. Seizure disorder        Plan:       Nonintractable epilepsy without status epilepticus, unspecified epilepsy type    Seizure disorder  -     levetiracetam XR (KEPPRA XR) 500 mg Tb24 24 hr tablet; TAKE 2 TABLETS BY MOUTH EVERY MORNING AND 3 AT BEDTIME  Dispense: 70 tablet; Refill: 1        Medication refill, I gave patient 2 weeks supply, inform patient if your insurance needsprior authorization, tele med  is  not able to do so, please call primary doctor or even neurologist Monday if a prior authorization needed,

## 2024-09-21 NOTE — TELEPHONE ENCOUNTER
Pt is almost out of leviteracetam medication and won't make it to Monday with current supply. Preferred pharmacy is [facility] on [address].     Dispo is to call PCP now. Connected with  who connected to [facility]. [facility] has only life and limb on-call. Notified patient and assisted into queue for OD [facility].     Reason for Disposition   [1] Prescription refill request for ESSENTIAL medicine (i.e., likelihood of harm to patient if not taken) AND [2] triager unable to refill per department policy    Protocols used: Medication Refill and Renewal Call-A-AH

## 2024-09-21 NOTE — PATIENT INSTRUCTIONS
Medication refill, I gave patient 2 weeks supply, inform patient if your insurance needs prior authorization, tele med  is  not able to do so, please call primary doctor or even neurologist Monday if a prior authorization needed,

## 2024-09-23 ENCOUNTER — PATIENT MESSAGE (OUTPATIENT)
Dept: NEUROLOGY | Facility: CLINIC | Age: 24
End: 2024-09-23
Payer: MEDICAID

## 2024-09-23 RX ORDER — LEVETIRACETAM 500 MG/1
TABLET, EXTENDED RELEASE ORAL
Qty: 150 TABLET | Refills: 0 | Status: SHIPPED | OUTPATIENT
Start: 2024-09-23

## 2024-09-30 ENCOUNTER — OFFICE VISIT (OUTPATIENT)
Dept: NEUROLOGY | Facility: CLINIC | Age: 24
End: 2024-09-30
Payer: MEDICAID

## 2024-09-30 DIAGNOSIS — G40.909 SEIZURE DISORDER: ICD-10-CM

## 2024-09-30 PROCEDURE — 99214 OFFICE O/P EST MOD 30 MIN: CPT | Mod: 95,,,

## 2024-09-30 RX ORDER — LEVETIRACETAM 500 MG/1
TABLET, EXTENDED RELEASE ORAL
Qty: 150 TABLET | Refills: 11 | Status: SHIPPED | OUTPATIENT
Start: 2024-10-07

## 2024-09-30 NOTE — PROGRESS NOTES
"The patient location is: her home in [place], [place]    The chief complaint leading to consultation is: epilepsy     Visit type: audiovisual     Face to Face time with patient:   12:30 minutes of total time spent on the encounter, which includes face to face time and non-face to face time preparing to see the patient (eg, review of tests), Obtaining and/or reviewing separately obtained history, Documenting clinical information in the electronic or other health record, Independently interpreting results (not separately reported) and communicating results to the patient/family/caregiver, or Care coordination (not separately reported).      Each patient to whom he or she provides medical services by telemedicine is:  (1) informed of the relationship between the physician and patient and the respective role of any other health care provider with respect to management of the patient; and (2) notified that he or she may decline to receive medical services by telemedicine and may withdraw from such care at any time.      Date of Service:   9/30/2024    Chief Complaint:  Seizures    History of Present Illness:   Ms. Meneses is a 24 y.o. female here for follow up of epilepsy. Patient is new to me. She was previously seen by Dr. Dangelo, Dr. Duenas, Dr. Sams, and Dr. Still.     Interval History:   Patient has continued Keppra XR 1000 mg qAM and 1500 mg qPM. She denies any side effects. She is complaint with medication. Her last seizure was in 2018.     Denies any depression. She is establishing care with psychiatry for ADHD and anxiety evaluation.  She is in [employer] school.       Prior HPI per Dr Dangelo:   Absence seizures  These began around age 15. With respect to aura, the patient reports nothing.  Her seizure is characterized by behavioral arrest.  This component of this spell lasts for approximately a few seconds.  Afterwards, she reports maybe feeling a bit "spacey."  The patient's frequency of events is low.  She does not think " "she has had one in years.     GTC seizures  These began around age 15. With respect to aura, the patient reports nothing.  Her seizure is characterized by loss of consciousness and generalized convulsion.  This component of this spell lasts for less than 2 minutes.  Afterwards, she reports it takes a while for her to fully regain consciousness and that she may be "spacey" the following days.  The patient has had approximately 5 of these events, the most recent was in 2018.      Potential Epilepsy Risk Factors:   Pregnancy/Labor/Delivery - none  Febrile seizures - none  Head injury  - none  CNS infection - none     Stroke - none  Family Hx of Sz - none     AED Treatments  Present regimen  Keppra XR 1000 mg in AM and 1500 mg in PM     MRI brain:  "Exam limited by metal artifact from dental braces.  Otherwise unremarkable study. No significant intracranial abnormality is identified."  I independently visualized the images of this study and interpreted them.  No acute intracranial process was appreciated.  Exam was technically limited by metallic artifact.     EEG (11/16):  "Probably abnormal EEG due to notched 3-4 cycle per second, paroxysmal slowing during hyperventilation, which is questionably epileptic in type.  Clinical correlation is suggested."  Her other EEGs here have been normal.     Review of patient's allergies indicates:   Allergen Reactions    Penicillins Rash       Current Outpatient Medications   Medication Sig Dispense Refill    azelastine (ASTELIN) 137 mcg (0.1 %) nasal spray 1 spray (137 mcg total) by Nasal route 2 (two) times daily. (Patient not taking: No sig reported) 30 mL 0    levetiracetam XR (KEPPRA XR) 500 mg Tb24 24 hr tablet TAKE 2 TABLETS BY MOUTH EVERY MORNING AND 3 AT BEDTIME 150 tablet 0     No current facility-administered medications for this visit.       Past Medical History:   Diagnosis Date    Epilepsy        No past surgical history on file.    No family history on file.    Social " History     Socioeconomic History    Marital status: Single   Tobacco Use    Smoking status: Never    Smokeless tobacco: Never   Substance and Sexual Activity    Alcohol use: No    Drug use: No    Sexual activity: Never     Social Drivers of Health     Financial Resource Strain: Low Risk  (9/28/2024)    Overall Financial Resource Strain (CARDIA)     Difficulty of Paying Living Expenses: Not hard at all   Food Insecurity: No Food Insecurity (9/28/2024)    Hunger Vital Sign     Worried About Running Out of Food in the Last Year: Never true     Ran Out of Food in the Last Year: Never true   Physical Activity: Insufficiently Active (9/28/2024)    Exercise Vital Sign     Days of Exercise per Week: 1 day     Minutes of Exercise per Session: 30 min   Stress: No Stress Concern Present (9/28/2024)    [employer] - Occupational Stress Questionnaire     Feeling of Stress : Only a little   Housing Stability: Unknown (9/28/2024)    Housing Stability Vital Sign     Unable to Pay for Housing in the Last Year: No       Review of Systems:  Comprehensive ROS is negative except as mentioned in the HPI.     Physical Exam:  LMP 09/15/2024   General: Well developed, well nourished.  No acute distress.    Neurological Exam:  General: well-developed, well-nourished, no distress  Mental status: Awake and alert  Speech language: No dysarthria or aphasia on conversation  Cranial nerves: Face symmetric  Motor: Moves all extremities well     Data:  I have personally reviewed the referring provider's notes, labs, & imaging made available to me today.     Labs:  CBC:   Lab Results   Component Value Date    WBC 7.67 09/27/2023    HGB 12.3 09/27/2023    HCT 36.2 (L) 09/27/2023     09/27/2023    MCV 86 09/27/2023    RDW 11.7 09/27/2023     BMP:   Lab Results   Component Value Date     09/27/2023    K 4.1 09/27/2023     09/27/2023    CO2 28 09/27/2023    BUN 9 09/27/2023    CREATININE 0.8 09/27/2023     GLU 85 09/27/2023    CALCIUM 9.4 09/27/2023     LFTS;   Lab Results   Component Value Date    PROT 7.2 09/27/2023    ALBUMIN 4.2 09/27/2023    BILITOT 0.6 09/27/2023    AST 19 09/27/2023    ALKPHOS 51 (L) 09/27/2023    ALT 14 09/27/2023       Assessment and Plan:  Ms. Meneses is a 24 y.o. female here for follow up of epilepsy. She is well controlled on Keppra XR 1000mg/1500mg. She denies any side effects. She is driving, no driving restrictions at this time. Will order updated labs for monitoring purposes.     Patient will follow up in 1 year.     Time Spent: I spent a total of 30 minutes on the day of the visit.This includes face to face time and non-face to face time preparing to see the patient (eg, review of tests), Obtaining and/or reviewing separately obtained history, Documenting clinical information in the electronic or other health record, Independently interpreting resultsand communicating results to the patient/family/caregiver, or Care coordination.    Patient was counseled on seizure safety including driving laws, water safety, and operation of machinery. We reviewed the importance of adequate sleep, compliance with medication, and limiting medications that may lower the seizure threshold (alcohol, tramadol, Wellbutrin, etc). All questions were answered and they were comfortable with the plan.

## 2024-10-07 ENCOUNTER — PATIENT MESSAGE (OUTPATIENT)
Dept: NEUROLOGY | Facility: CLINIC | Age: 24
End: 2024-10-07
Payer: MEDICAID

## 2024-11-07 ENCOUNTER — TELEPHONE (OUTPATIENT)
Dept: NEUROLOGY | Facility: CLINIC | Age: 24
End: 2024-11-07
Payer: MEDICAID

## 2024-11-07 DIAGNOSIS — G40.909 SEIZURE DISORDER: ICD-10-CM

## 2024-11-07 RX ORDER — LEVETIRACETAM 500 MG/1
TABLET, EXTENDED RELEASE ORAL
Qty: 150 TABLET | Refills: 11 | Status: CANCELLED | OUTPATIENT
Start: 2024-11-07

## 2024-11-07 RX ORDER — LEVETIRACETAM 500 MG/1
TABLET, EXTENDED RELEASE ORAL
Qty: 150 TABLET | Refills: 11 | Status: SHIPPED | OUTPATIENT
Start: 2024-11-07

## 2024-11-07 NOTE — TELEPHONE ENCOUNTER
----- Message from Karen sent at 11/7/2024  3:04 PM CST -----  Regarding: RX Refill Request needed today  Contact: patient at [phone]  Type:  RX Refill Request needed today    Who Called:  patient at [phone]    Preferred Pharmacy with phone number:      [facility] #[id number] - [place] [address] [place] [address]  [facility] [address]  Phone: [phone] Fax: [phone]      Additional Information:  patient is out of medication and requesting a refill. Please call and advise. Thank you    levetiracetam XR (KEPPRA XR) 500 mg Tb24 24 hr tablet 150 tablet 11 10/7/2024    Sig: TAKE 2 TABLETS BY MOUTH EVERY MORNING AND 3 AT BEDTIME   Sent to pharmacy as: levetiracetam XR (KEPPRA XR) 500 mg Tb24 24 hr tablet   E-Prescribing Status: Receipt confirmed by pharmacy (9/30/2024  4:01 PM CDT)

## 2024-11-07 NOTE — TELEPHONE ENCOUNTER
Spoke with patient and informed of prescription sent on 10/7/24. Instructed patient to call Renetta and make sure she gets transferred to someone working in the pharmacy and not the call center and have them look for the prescription from 10/7 on her profile

## 2024-11-20 ENCOUNTER — TELEPHONE (OUTPATIENT)
Dept: NEUROLOGY | Facility: CLINIC | Age: 24
End: 2024-11-20
Payer: MEDICAID

## 2024-12-02 ENCOUNTER — LAB VISIT (OUTPATIENT)
Dept: LAB | Facility: HOSPITAL | Age: 24
End: 2024-12-02
Payer: MEDICAID

## 2024-12-02 ENCOUNTER — PATIENT MESSAGE (OUTPATIENT)
Dept: NEUROLOGY | Facility: CLINIC | Age: 24
End: 2024-12-02
Payer: MEDICAID

## 2024-12-02 DIAGNOSIS — G40.909 SEIZURE DISORDER: ICD-10-CM

## 2024-12-02 LAB
ALBUMIN SERPL BCP-MCNC: 4.3 G/DL (ref 3.5–5.2)
ALP SERPL-CCNC: 47 U/L (ref 40–150)
ALT SERPL W/O P-5'-P-CCNC: 11 U/L (ref 10–44)
ANION GAP SERPL CALC-SCNC: 10 MMOL/L (ref 8–16)
AST SERPL-CCNC: 17 U/L (ref 10–40)
BASOPHILS # BLD AUTO: 0.03 K/UL (ref 0–0.2)
BASOPHILS NFR BLD: 0.5 % (ref 0–1.9)
BILIRUB SERPL-MCNC: 0.3 MG/DL (ref 0.1–1)
BUN SERPL-MCNC: 8 MG/DL (ref 6–20)
CALCIUM SERPL-MCNC: 9.4 MG/DL (ref 8.7–10.5)
CHLORIDE SERPL-SCNC: 107 MMOL/L (ref 95–110)
CO2 SERPL-SCNC: 22 MMOL/L (ref 23–29)
CREAT SERPL-MCNC: 0.7 MG/DL (ref 0.5–1.4)
DIFFERENTIAL METHOD BLD: NORMAL
EOSINOPHIL # BLD AUTO: 0.1 K/UL (ref 0–0.5)
EOSINOPHIL NFR BLD: 1.9 % (ref 0–8)
ERYTHROCYTE [DISTWIDTH] IN BLOOD BY AUTOMATED COUNT: 11.6 % (ref 11.5–14.5)
EST. GFR  (NO RACE VARIABLE): >60 ML/MIN/1.73 M^2
GLUCOSE SERPL-MCNC: 95 MG/DL (ref 70–110)
HCT VFR BLD AUTO: 37.2 % (ref 37–48.5)
HGB BLD-MCNC: 12 G/DL (ref 12–16)
IMM GRANULOCYTES # BLD AUTO: 0.02 K/UL (ref 0–0.04)
IMM GRANULOCYTES NFR BLD AUTO: 0.3 % (ref 0–0.5)
LYMPHOCYTES # BLD AUTO: 2.2 K/UL (ref 1–4.8)
LYMPHOCYTES NFR BLD: 37.7 % (ref 18–48)
MCH RBC QN AUTO: 29.2 PG (ref 27–31)
MCHC RBC AUTO-ENTMCNC: 32.3 G/DL (ref 32–36)
MCV RBC AUTO: 91 FL (ref 82–98)
MONOCYTES # BLD AUTO: 0.5 K/UL (ref 0.3–1)
MONOCYTES NFR BLD: 9 % (ref 4–15)
NEUTROPHILS # BLD AUTO: 3 K/UL (ref 1.8–7.7)
NEUTROPHILS NFR BLD: 50.6 % (ref 38–73)
NRBC BLD-RTO: 0 /100 WBC
PLATELET # BLD AUTO: 261 K/UL (ref 150–450)
PMV BLD AUTO: 11.2 FL (ref 9.2–12.9)
POTASSIUM SERPL-SCNC: 4 MMOL/L (ref 3.5–5.1)
PROT SERPL-MCNC: 7.3 G/DL (ref 6–8.4)
RBC # BLD AUTO: 4.11 M/UL (ref 4–5.4)
SODIUM SERPL-SCNC: 139 MMOL/L (ref 136–145)
WBC # BLD AUTO: 5.86 K/UL (ref 3.9–12.7)

## 2024-12-02 PROCEDURE — 80177 DRUG SCRN QUAN LEVETIRACETAM: CPT

## 2024-12-02 PROCEDURE — 85025 COMPLETE CBC W/AUTO DIFF WBC: CPT

## 2024-12-02 PROCEDURE — 80053 COMPREHEN METABOLIC PANEL: CPT

## 2024-12-05 LAB — LEVETIRACETAM SERPL-MCNC: 35.1 UG/ML (ref 3–60)

## 2025-07-28 ENCOUNTER — PATIENT MESSAGE (OUTPATIENT)
Dept: NEUROLOGY | Facility: CLINIC | Age: 25
End: 2025-07-28
Payer: MEDICAID

## 2025-07-28 DIAGNOSIS — G40.909 SEIZURE DISORDER: ICD-10-CM

## 2025-07-29 RX ORDER — LEVETIRACETAM 500 MG/1
TABLET, EXTENDED RELEASE ORAL
Qty: 150 TABLET | Refills: 1 | Status: SHIPPED | OUTPATIENT
Start: 2025-07-29

## 2025-07-29 RX ORDER — LEVETIRACETAM 500 MG/1
TABLET, EXTENDED RELEASE ORAL
Qty: 150 TABLET | Refills: 11 | OUTPATIENT
Start: 2025-07-29

## 2025-08-21 ENCOUNTER — PATIENT MESSAGE (OUTPATIENT)
Dept: NEUROLOGY | Facility: CLINIC | Age: 25
End: 2025-08-21

## 2025-08-21 ENCOUNTER — OFFICE VISIT (OUTPATIENT)
Dept: NEUROLOGY | Facility: CLINIC | Age: 25
End: 2025-08-21
Payer: MEDICAID

## 2025-08-21 DIAGNOSIS — G40.909 SEIZURE DISORDER: ICD-10-CM

## 2025-08-21 PROCEDURE — 98005 SYNCH AUDIO-VIDEO EST LOW 20: CPT | Mod: 95,,,

## 2025-08-21 PROCEDURE — 1159F MED LIST DOCD IN RCRD: CPT | Mod: CPTII,95,,

## 2025-08-21 PROCEDURE — 1160F RVW MEDS BY RX/DR IN RCRD: CPT | Mod: CPTII,95,,

## 2025-08-21 RX ORDER — LEVETIRACETAM 500 MG/1
TABLET, EXTENDED RELEASE ORAL
Qty: 450 TABLET | Refills: 3 | Status: SHIPPED | OUTPATIENT
Start: 2025-08-21